# Patient Record
Sex: MALE | Race: WHITE | URBAN - METROPOLITAN AREA
[De-identification: names, ages, dates, MRNs, and addresses within clinical notes are randomized per-mention and may not be internally consistent; named-entity substitution may affect disease eponyms.]

---

## 2017-03-22 ENCOUNTER — TELEPHONE (OUTPATIENT)
Dept: FAMILY MEDICINE | Facility: CLINIC | Age: 82
End: 2017-03-22

## 2017-03-22 DIAGNOSIS — E78.5 HYPERLIPIDEMIA LDL GOAL <130: ICD-10-CM

## 2017-03-22 NOTE — TELEPHONE ENCOUNTER
lovastatin (MEVACOR) 10 MG tablet     Last Written Prescription Date: 12/20/2016  Last Fill Quantity: 90, # refills: 0  Last Office Visit with [facility], [facility] or [facility] prescribing provider: 12/28/2015       Lab Results   Component Value Date    CHOL 201 12/28/2015     Lab Results   Component Value Date    HDL 49 12/28/2015     Lab Results   Component Value Date     12/28/2015     Lab Results   Component Value Date    TRIG 120 12/28/2015     Lab Results   Component Value Date    CHOLHDLRATIO 3.4 05/23/2014     Justina Monet MA

## 2017-03-22 NOTE — LETTER
[facility]  [address]  [place] [address]  [phone]          March 31, 2017    Sukhi Chavez                                                                                                                     [address]  [facility] [address]            Dear Sukhi,    We have tried to reach you by phone on several occasions, but were unable to do so.    We received a request from your pharmacy to refill your lovastatin (MEVACOR) 10 MG tablet, however, we had to deny this request as you have not been seen in the office by me since 12-28-15.    If you are seeing another provider who is refilling this medication for you, please let us know so that we can update our records. Otherwise, please schedule an office visit at your earliest convenience.    Feel free to contact us at [phone], with any questions or concerns.  Thank you.    Sincerely,       Prieto Veloz MD/lazaro

## 2017-03-23 NOTE — TELEPHONE ENCOUNTER
Routing refill request to provider for review/approval because:  Taylor given x1 and patient did not follow up, please advise  Patient needs to be seen because it has been more than 1 year since last office visit.      Dirk Sofia RN

## 2017-03-24 RX ORDER — LOVASTATIN 10 MG
TABLET ORAL
Qty: 90 TABLET | Refills: 0 | OUTPATIENT
Start: 2017-03-24

## 2017-09-14 ENCOUNTER — TELEPHONE (OUTPATIENT)
Dept: FAMILY MEDICINE | Facility: CLINIC | Age: 82
End: 2017-09-14

## 2017-09-14 DIAGNOSIS — N52.8 OTHER MALE ERECTILE DYSFUNCTION: ICD-10-CM

## 2017-09-15 NOTE — TELEPHONE ENCOUNTER
tadalafil (CIALIS) 20 MG tablet      Last Written Prescription Date: 12/28/2015  Last Fill Quantity: 12,  # refills: 11   Last Office Visit with [facility], [facility] or [facility] prescribing provider: 12/28/2015

## 2017-09-15 NOTE — TELEPHONE ENCOUNTER
Routing refill request to provider for review/approval because:  Patient needs to be seen because it has been more than 1 year since last office visit.      Adelaide Holman RN - BC

## 2017-09-15 NOTE — TELEPHONE ENCOUNTER
Send letter with      1. All prescriptions Hard copy prescriptions with 30 days without additional refills   2. Instructions to please make appointment within 30 days   3. No further refills until appointment      Prieto Veloz MD

## 2017-09-20 RX ORDER — TADALAFIL 20 MG
TABLET ORAL
Qty: 12 TABLET | Refills: 0 | Status: SHIPPED | OUTPATIENT
Start: 2017-09-20

## 2017-09-20 NOTE — TELEPHONE ENCOUNTER
Letter sent to patient. Letter was also sent to patient 3-31-17, informing him that he would need to be seen (regarding a different medication). Patient last saw Dr. Veloz for a physical on 12-28-15. Mis Zaragoza,